# Patient Record
Sex: MALE | Race: WHITE
[De-identification: names, ages, dates, MRNs, and addresses within clinical notes are randomized per-mention and may not be internally consistent; named-entity substitution may affect disease eponyms.]

---

## 2018-01-03 ENCOUNTER — HOSPITAL ENCOUNTER (EMERGENCY)
Dept: HOSPITAL 17 - NEPE | Age: 68
Discharge: HOME | End: 2018-01-03
Payer: MEDICARE

## 2018-01-03 VITALS
DIASTOLIC BLOOD PRESSURE: 101 MMHG | HEART RATE: 58 BPM | SYSTOLIC BLOOD PRESSURE: 230 MMHG | OXYGEN SATURATION: 97 % | RESPIRATION RATE: 16 BRPM

## 2018-01-03 VITALS — BODY MASS INDEX: 25.77 KG/M2 | WEIGHT: 207.23 LBS | HEIGHT: 75 IN

## 2018-01-03 VITALS — DIASTOLIC BLOOD PRESSURE: 90 MMHG | SYSTOLIC BLOOD PRESSURE: 230 MMHG

## 2018-01-03 VITALS
TEMPERATURE: 99.2 F | HEART RATE: 75 BPM | OXYGEN SATURATION: 97 % | SYSTOLIC BLOOD PRESSURE: 239 MMHG | RESPIRATION RATE: 18 BRPM | DIASTOLIC BLOOD PRESSURE: 107 MMHG

## 2018-01-03 DIAGNOSIS — N40.0: ICD-10-CM

## 2018-01-03 DIAGNOSIS — N39.0: ICD-10-CM

## 2018-01-03 DIAGNOSIS — E87.6: ICD-10-CM

## 2018-01-03 DIAGNOSIS — I10: ICD-10-CM

## 2018-01-03 DIAGNOSIS — N28.9: ICD-10-CM

## 2018-01-03 DIAGNOSIS — M19.90: ICD-10-CM

## 2018-01-03 DIAGNOSIS — N41.0: Primary | ICD-10-CM

## 2018-01-03 LAB
BACTERIA #/AREA URNS HPF: (no result) /HPF
BASOPHILS # BLD AUTO: 0 TH/MM3 (ref 0–0.2)
BASOPHILS NFR BLD: 0.3 % (ref 0–2)
BUN SERPL-MCNC: 14 MG/DL (ref 7–18)
CALCIUM SERPL-MCNC: 8.8 MG/DL (ref 8.5–10.1)
CHLORIDE SERPL-SCNC: 105 MEQ/L (ref 98–107)
COLOR UR: (no result)
CREAT SERPL-MCNC: 1.45 MG/DL (ref 0.6–1.3)
EOSINOPHIL # BLD: 0 TH/MM3 (ref 0–0.4)
EOSINOPHIL NFR BLD: 0.2 % (ref 0–4)
ERYTHROCYTE [DISTWIDTH] IN BLOOD BY AUTOMATED COUNT: 13.3 % (ref 11.6–17.2)
GFR SERPLBLD BASED ON 1.73 SQ M-ARVRAT: 49 ML/MIN (ref 89–?)
GLUCOSE SERPL-MCNC: 100 MG/DL (ref 74–106)
GLUCOSE UR STRIP-MCNC: (no result) MG/DL
HCO3 BLD-SCNC: 32.7 MEQ/L (ref 21–32)
HCT VFR BLD CALC: 39.5 % (ref 39–51)
HGB BLD-MCNC: 13.1 GM/DL (ref 13–17)
HGB UR QL STRIP: (no result)
KETONES UR STRIP-MCNC: (no result) MG/DL
LYMPHOCYTES # BLD AUTO: 1.3 TH/MM3 (ref 1–4.8)
LYMPHOCYTES NFR BLD AUTO: 10.6 % (ref 9–44)
MCH RBC QN AUTO: 29.4 PG (ref 27–34)
MCHC RBC AUTO-ENTMCNC: 33.3 % (ref 32–36)
MCV RBC AUTO: 88.2 FL (ref 80–100)
MONOCYTE #: 0.7 TH/MM3 (ref 0–0.9)
MONOCYTES NFR BLD: 6.2 % (ref 0–8)
NEUTROPHILS # BLD AUTO: 9.9 TH/MM3 (ref 1.8–7.7)
NEUTROPHILS NFR BLD AUTO: 82.7 % (ref 16–70)
NITRITE UR QL STRIP: (no result)
PLATELET # BLD: 165 TH/MM3 (ref 150–450)
PMV BLD AUTO: 8.7 FL (ref 7–11)
RBC # BLD AUTO: 4.47 MIL/MM3 (ref 4.5–5.9)
SODIUM SERPL-SCNC: 142 MEQ/L (ref 136–145)
SP GR UR STRIP: 1.01 (ref 1–1.03)
SQUAMOUS #/AREA URNS HPF: <1 /HPF (ref 0–5)
URINE LEUKOCYTE ESTERASE: (no result)
WBC # BLD AUTO: 12 TH/MM3 (ref 4–11)

## 2018-01-03 PROCEDURE — 99284 EMERGENCY DEPT VISIT MOD MDM: CPT

## 2018-01-03 PROCEDURE — 85025 COMPLETE CBC W/AUTO DIFF WBC: CPT

## 2018-01-03 PROCEDURE — 96374 THER/PROPH/DIAG INJ IV PUSH: CPT

## 2018-01-03 PROCEDURE — 81001 URINALYSIS AUTO W/SCOPE: CPT

## 2018-01-03 PROCEDURE — 80048 BASIC METABOLIC PNL TOTAL CA: CPT

## 2018-01-03 PROCEDURE — 87086 URINE CULTURE/COLONY COUNT: CPT

## 2018-01-03 NOTE — PD
HPI


Chief Complaint:   Complaint


Time Seen by Provider:  09:22


Travel History


International Travel<30 days:  No


Contact w/Intl Traveler<30days:  No


Traveled to known affect area:  No





History of Present Illness


HPI


This is a 67-year-old male who presents to the emergency department with 4 days 

of intermittent blood in the urine, difficulty urinating, dysuria and 

discomfort in his lower abdomen, constant, moderate severity associated with 

some subjective fevers and chills.  He has a history of an enlarged prostate.  

He hasn't seen a primary care doctor in several years.  He also reports that he'

s been having a flare of his arthritis mostly in his lower extremities that's 

been going on over the past month and his blood pressure has been running high.

  He is not on any medicines for his blood pressure.





PFSH


Past Medical History


Arthritis:  Yes


Heart Rhythm Problems:  Yes (MURMUR)


Genitourinary:  Yes (PROSTATE)


Medical other:  Yes (RITTERS SYNDROME)


Tetanus Vaccination:  Unknown


Influenza Vaccination:  No





Past Surgical History


Other Surgery:  Yes (CUT ARTERY IN RIGHT FOREARM)





Social History


Alcohol Use:  No


Tobacco Use:  No


Substance Use:  No





Allergies-Medications


(Allergen,Severity, Reaction):  


Coded Allergies:  


     Egg Derived (Verified  Allergy, Severe, asthma attack, 1/3/18)


     ragweed pollen (Verified  Allergy, Severe, asthma attack, 1/3/18)


     strawberry (Verified  Allergy, Severe, asthma , 1/3/18)


Uncoded Allergies:  


     bird dander (Allergy, Severe, asthma attack, 1/3/18)


     jose (Allergy, Severe, asthma attack, 1/3/18)


Reported Meds & Prescriptions





Reported Meds & Active Scripts


Active


Tramadol (Tramadol HCl) 50 Mg Tab 50 Mg PO Q6H PRN


Bactrim DS (Sulfamethoxazole-Trimethoprim) 800-160 Mg Tab 1 Tab PO BID 42 Days


Amlodipine (Amlodipine Besylate) 5 Mg Tab 5 Mg PO DAILY


Flomax (Tamsulosin HCl) 0.4 Mg Cap 0.4 Mg PO HS








Review of Systems


Except as stated in HPI:  all other systems reviewed are Neg





Physical Exam


Narrative


GENERAL:Well appearing, no acute distress


SKIN: Focused skin assessment warm and dry.


HEAD: Atraumatic. Normocephalic. 


EYES: Pupils equal and round.  No injection or drainage. 


ENT:  Moist mucous membranes


NECK: Trachea midline. 


CARDIOVASCULAR: Regular rate and rhythm.  No murmur appreciated.


RESPIRATORY: Clear to auscultation. Breath sounds equal bilaterally. 


GASTROINTESTINAL: Abdomen soft, tender to palpation in the lower abdomen with 

no rebound or guarding.Grossly enlarged prostate, tender to palpation


MUSCULOSKELETAL: No obvious deformities. 


NEUROLOGICAL: Awake and alert. No obvious cranial nerve deficits.  Moving all 

extremities.


PSYCHIATRIC: Appropriate mood and affect; insight and judgment normal.





Data


Data


Last Documented VS





Vital Signs








  Date Time  Temp Pulse Resp B/P (MAP) Pulse Ox O2 Delivery O2 Flow Rate FiO2


 


1/3/18 09:30  58 16 230/101 (144) 97 Room Air  


 


1/3/18 08:36 99.2       








Orders





 Orders


Urinalysis - C+S If Indicated (1/3/18 08:54)


Complete Blood Count With Diff (1/3/18 09:07)


Basic Metabolic Panel (Bmp) (1/3/18 09:07)


^ Insert Iv (1/3/18 09:07)


Urine Culture (1/3/18 08:40)


Ketorolac Inj (Toradol Inj) (1/3/18 10:30)


Ciprofloxacin 400 Mg Premix (Cipro 400 M (1/3/18 10:30)


Amlodipine (Norvasc) (1/3/18 10:30)


Ed Discharge Order (1/3/18 11:41)





Labs





Laboratory Tests








Test


  1/3/18


08:40 1/3/18


10:35


 


Urine Color LIGHT-YELLOW  


 


Urine Turbidity HAZY  


 


Urine pH 7.5  


 


Urine Specific Gravity 1.009  


 


Urine Protein NEG mg/dL  


 


Urine Glucose (UA) NEG mg/dL  


 


Urine Ketones NEG mg/dL  


 


Urine Occult Blood SMALL  


 


Urine Nitrite NEG  


 


Urine Bilirubin NEG  


 


Urine Urobilinogen


  LESS THAN 2.0


MG/DL 


 


 


Urine Leukocyte Esterase LARGE  


 


Urine RBC 27 /hpf  


 


Urine WBC  /hpf  


 


Urine Squamous Epithelial


Cells <1 /hpf 


  


 


 


Urine Bacteria RARE /hpf  


 


Microscopic Urinalysis Comment


  CULTURE


INDICATED 


 


 


White Blood Count  12.0 TH/MM3 


 


Red Blood Count  4.47 MIL/MM3 


 


Hemoglobin  13.1 GM/DL 


 


Hematocrit  39.5 % 


 


Mean Corpuscular Volume  88.2 FL 


 


Mean Corpuscular Hemoglobin  29.4 PG 


 


Mean Corpuscular Hemoglobin


Concent 


  33.3 % 


 


 


Red Cell Distribution Width  13.3 % 


 


Platelet Count  165 TH/MM3 


 


Mean Platelet Volume  8.7 FL 


 


Neutrophils (%) (Auto)  82.7 % 


 


Lymphocytes (%) (Auto)  10.6 % 


 


Monocytes (%) (Auto)  6.2 % 


 


Eosinophils (%) (Auto)  0.2 % 


 


Basophils (%) (Auto)  0.3 % 


 


Neutrophils # (Auto)  9.9 TH/MM3 


 


Lymphocytes # (Auto)  1.3 TH/MM3 


 


Monocytes # (Auto)  0.7 TH/MM3 


 


Eosinophils # (Auto)  0.0 TH/MM3 


 


Basophils # (Auto)  0.0 TH/MM3 


 


CBC Comment  DIFF FINAL 


 


Differential Comment   


 


Blood Urea Nitrogen  14 MG/DL 


 


Creatinine  1.45 MG/DL 


 


Random Glucose  100 MG/DL 


 


Calcium Level  8.8 MG/DL 


 


Sodium Level  142 MEQ/L 


 


Potassium Level  3.2 MEQ/L 


 


Chloride Level  105 MEQ/L 


 


Carbon Dioxide Level  32.7 MEQ/L 


 


Anion Gap  4 MEQ/L 


 


Estimat Glomerular Filtration


Rate 


  49 ML/MIN 


 











MDM


Medical Decision Making


Medical Screen Exam Complete:  Yes


Emergency Medical Condition:  Yes


Interpretation(s)


temperature is 99.2, hypertensive


leukocytosis


mild hypokalemia


renal insufficiency


urinalysis: infection


Differential Diagnosis


Prostatitis, urinary tract infection, pyelonephritis, sepsis


Narrative Course


This is a 67-year-old male who presents to the emergency department with dysuria

, pressure in his lower abdomen and some rectal pain associated with some blood 

in his urine.  I suspect he has prostatitis.  Labs were obtained which 

demonstrate a mild leukocytosis.  Electrolytes are reassuring.  Urinalysis 

demonstrates urinary tract infection.  Patient is quite hypertensive which I 

think is chronic.  He is nontoxic appearing.  He will be initiated on 

amlodipine and he'll be discharged with Bactrim for 6 weeks.  I urged him to 

follow-up with a primary care physician.





Diagnosis





 Primary Impression:  


 Prostatitis


 Qualified Codes:  N41.0 - Acute prostatitis


Patient Instructions:  General Instructions





***Additional Instructions:  


If you develop fever, persistent vomiting, back pain, or inability to eat 

return to the emergency department.


Complete your antibiotics as prescribed.


Stay well hydrated with Gatorade or water.


Followup with your primary care physician in 2-3 days if your symptoms have not 

resolved.


***Med/Other Pt SpecificInfo:  Prescription(s) given


Scripts


Tramadol (Tramadol) 50 Mg Tab


50 MG PO Q6H Y for PAIN, #15 TAB 0 Refills


   Prov: Lynne Doll MD         1/3/18 


Sulfamethoxazole-Trimethoprim (Bactrim DS) 800-160 Mg Tab


1 TAB PO BID for Infection for 42 Days, #84 TAB 0 Refills


   Prov: Lynne Doll MD         1/3/18 


Amlodipine (Amlodipine) 5 Mg Tab


5 MG PO DAILY for Blood Pressure Management, #30 TAB 0 Refills


   Prov: Lynne Doll MD         1/3/18 


Tamsulosin (Flomax) 0.4 Mg Cap


0.4 MG PO HS for Manage Prostate Problems, #30 CAP 0 Refills


   Prov: Lynne Doll MD         1/3/18


Disposition:  01 DISCHARGE HOME


Condition:  Stable











Lynne Doll MD Cesario 3, 2018 11:41

## 2018-01-21 ENCOUNTER — HOSPITAL ENCOUNTER (INPATIENT)
Dept: HOSPITAL 17 - NEPE | Age: 68
LOS: 2 days | Discharge: HOME | DRG: 682 | End: 2018-01-23
Attending: HOSPITALIST | Admitting: HOSPITALIST
Payer: MEDICARE

## 2018-01-21 DIAGNOSIS — M19.90: ICD-10-CM

## 2018-01-21 DIAGNOSIS — F41.9: ICD-10-CM

## 2018-01-21 DIAGNOSIS — N40.1: ICD-10-CM

## 2018-01-21 DIAGNOSIS — N39.0: ICD-10-CM

## 2018-01-21 DIAGNOSIS — N13.1: ICD-10-CM

## 2018-01-21 DIAGNOSIS — E87.5: ICD-10-CM

## 2018-01-21 DIAGNOSIS — Z91.018: ICD-10-CM

## 2018-01-21 DIAGNOSIS — N13.8: ICD-10-CM

## 2018-01-21 DIAGNOSIS — M02.39: ICD-10-CM

## 2018-01-21 DIAGNOSIS — I10: ICD-10-CM

## 2018-01-21 DIAGNOSIS — F12.90: ICD-10-CM

## 2018-01-21 DIAGNOSIS — N17.9: Primary | ICD-10-CM

## 2018-01-21 DIAGNOSIS — K85.90: ICD-10-CM

## 2018-01-21 DIAGNOSIS — I16.0: ICD-10-CM

## 2018-01-21 DIAGNOSIS — Z91.012: ICD-10-CM

## 2018-01-21 DIAGNOSIS — Z87.891: ICD-10-CM

## 2018-01-21 LAB
ALBUMIN: 3.9 GM/DL (ref 3.4–5)
ALKALINE PHOSPHATASE: 80 U/L (ref 45–117)
ALT (GPT): 33 U/L (ref 12–78)
ANION GAP: 10 MEQ/L (ref 5–15)
AST (GOT): 21 U/L (ref 15–37)
AUTOMATED NEUTROPHIL #: 9.5 TH/MM3 (ref 1.8–7.7)
BACTERIA, URINE: (no result) /HPF
BASOPHIL #: 0 TH/MM3 (ref 0–0.2)
BASOPHIL %: 0.4 % (ref 0–2)
BICARBONATE: 23 MEQ/L (ref 21–32)
BILIRUB SERPL-MCNC: 0.5 MG/DL (ref 0.2–1)
BLOOD UREA NITROGEN: 40 MG/DL (ref 7–18)
BLOOD, URINE: (no result)
CALCIUM: 9.3 MG/DL (ref 8.5–10.1)
CHLORIDE: 102 MEQ/L (ref 98–107)
COMMENT (UR): (no result)
CREATININE: 5.89 MG/DL (ref 0.6–1.3)
CULTURE IF INDICATED: (no result)
EOSINOPHIL #: 0.1 TH/MM3 (ref 0–0.4)
EOSINOPHIL %: 0.8 % (ref 0–4)
GLOMERULAR FILTRATION RATE: 10 ML/MIN (ref 89–?)
GLUCOSE,RANDOM: 91 MG/DL (ref 74–106)
GLUCOSE,URINE: (no result) MG/DL
HEMATOCRIT: 39.6 % (ref 39–51)
HEMO FLAGS: (no result)
HEMOGLOBIN: 13.2 GM/DL (ref 13–17)
KETONE, URINE: (no result) MG/DL
LACTIC ACID: 0.8 MMOL/L (ref 0.4–2)
LIPASE: 438 U/L (ref 73–393)
LYMPH %: 10.1 % (ref 9–44)
LYMPHOCYTE #: 1.2 TH/MM3 (ref 1–4.8)
MEAN CELL VOLUME: 87.2 FL (ref 80–100)
MEAN CORPUSCULAR HEMOGLOBIN: 29.2 PG (ref 27–34)
MEAN CORPUSCULAR HGB CONC: 33.4 % (ref 32–36)
MEAN PLATELET VOLUME: 7 FL (ref 7–11)
MONO %: 5.9 % (ref 0–8)
MONOCYTE #: 0.7 TH/MM3 (ref 0–0.9)
NEUT %: 82.8 % (ref 16–70)
NITRITE,URINE: (no result)
PLATELET COUNT: 251 TH/MM3 (ref 150–450)
POTASSIUM: 5.5 MEQ/L (ref 3.5–5.1)
RED BLOOD COUNT: 4.53 MIL/MM3 (ref 4.5–5.9)
RED CELL DISTRIBUTION WIDTH: 13.6 % (ref 11.6–17.2)
SODIUM (NA): 135 MEQ/L (ref 136–145)
SPECIFIC GRAVITY,URINE: 1.01 (ref 1–1.03)
TOTAL PROTEIN: 8.5 GM/DL (ref 6.4–8.2)
URINE COLOR: (no result)
URINE LEUKOCYTE ESTERASE: (no result)
WHITE BLOOD CELL CLUMPS: (no result)
WHITE BLOOD COUNT: 11.5 TH/MM3 (ref 4–11)

## 2018-01-21 PROCEDURE — 81001 URINALYSIS AUTO W/SCOPE: CPT

## 2018-01-21 PROCEDURE — 83690 ASSAY OF LIPASE: CPT

## 2018-01-21 PROCEDURE — 85025 COMPLETE CBC W/AUTO DIFF WBC: CPT

## 2018-01-21 PROCEDURE — 87040 BLOOD CULTURE FOR BACTERIA: CPT

## 2018-01-21 PROCEDURE — 74176 CT ABD & PELVIS W/O CONTRAST: CPT

## 2018-01-21 PROCEDURE — 83605 ASSAY OF LACTIC ACID: CPT

## 2018-01-21 PROCEDURE — 87086 URINE CULTURE/COLONY COUNT: CPT

## 2018-01-21 PROCEDURE — 96375 TX/PRO/DX INJ NEW DRUG ADDON: CPT

## 2018-01-21 PROCEDURE — 99285 EMERGENCY DEPT VISIT HI MDM: CPT

## 2018-01-21 PROCEDURE — 96365 THER/PROPH/DIAG IV INF INIT: CPT

## 2018-01-21 PROCEDURE — 80053 COMPREHEN METABOLIC PANEL: CPT

## 2018-01-21 RX ADMIN — TAMSULOSIN HYDROCHLORIDE 1 MG: 0.4 CAPSULE ORAL at 20:07

## 2018-01-21 RX ADMIN — Medication 1 ML: at 20:08

## 2018-01-21 RX ADMIN — STANDARDIZED SENNA CONCENTRATE AND DOCUSATE SODIUM 1 TAB: 8.6; 5 TABLET, FILM COATED ORAL at 20:08

## 2018-01-21 RX ADMIN — PHENYTOIN SODIUM 1 MLS/HR: 50 INJECTION INTRAMUSCULAR; INTRAVENOUS at 17:01

## 2018-01-21 RX ADMIN — SODIUM CHLORIDE 1 MLS/HR: 900 INJECTION INTRAVENOUS at 17:01

## 2018-01-21 RX ADMIN — MORPHINE SULFATE 1 MG: 2 INJECTION, SOLUTION INTRAMUSCULAR; INTRAVENOUS at 17:01

## 2018-01-21 RX ADMIN — ONDANSETRON 1 MG: 2 INJECTION, SOLUTION INTRAMUSCULAR; INTRAVENOUS at 17:02

## 2018-01-21 RX ADMIN — SODIUM BICARBONATE 1 MLS/HR: 84 INJECTION, SOLUTION INTRAVENOUS at 19:23

## 2018-01-22 LAB
ALBUMIN: 3.4 GM/DL (ref 3.4–5)
ALKALINE PHOSPHATASE: 68 U/L (ref 45–117)
ALT (GPT): 29 U/L (ref 12–78)
ANION GAP: 8 MEQ/L (ref 5–15)
AST (GOT): 22 U/L (ref 15–37)
AUTOMATED NEUTROPHIL #: 8 TH/MM3 (ref 1.8–7.7)
BASOPHIL #: 0.1 TH/MM3 (ref 0–0.2)
BASOPHIL %: 0.6 % (ref 0–2)
BICARBONATE: 22.1 MEQ/L (ref 21–32)
BILIRUB SERPL-MCNC: 0.4 MG/DL (ref 0.2–1)
BLOOD UREA NITROGEN: 27 MG/DL (ref 7–18)
CALCIUM: 9 MG/DL (ref 8.5–10.1)
CHLORIDE: 113 MEQ/L (ref 98–107)
CREATININE: 3.37 MG/DL (ref 0.6–1.3)
EOSINOPHIL #: 0.1 TH/MM3 (ref 0–0.4)
EOSINOPHIL %: 0.9 % (ref 0–4)
GLOMERULAR FILTRATION RATE: 18 ML/MIN (ref 89–?)
GLUCOSE,RANDOM: 102 MG/DL (ref 74–106)
HEMATOCRIT: 41.4 % (ref 39–51)
HEMO FLAGS: (no result)
HEMOGLOBIN: 14.3 GM/DL (ref 13–17)
LIPASE: 289 U/L (ref 73–393)
LYMPH %: 12.4 % (ref 9–44)
LYMPHOCYTE #: 1.3 TH/MM3 (ref 1–4.8)
MEAN CELL VOLUME: 87.2 FL (ref 80–100)
MEAN CORPUSCULAR HEMOGLOBIN: 30.2 PG (ref 27–34)
MEAN CORPUSCULAR HGB CONC: 34.6 % (ref 32–36)
MEAN PLATELET VOLUME: 7.6 FL (ref 7–11)
MONO %: 7.8 % (ref 0–8)
MONOCYTE #: 0.8 TH/MM3 (ref 0–0.9)
NEUT %: 78.3 % (ref 16–70)
PLATELET COUNT: 248 TH/MM3 (ref 150–450)
POTASSIUM: 5.5 MEQ/L (ref 3.5–5.1)
RED BLOOD COUNT: 4.75 MIL/MM3 (ref 4.5–5.9)
RED CELL DISTRIBUTION WIDTH: 13.8 % (ref 11.6–17.2)
SODIUM (NA): 143 MEQ/L (ref 136–145)
TOTAL PROTEIN: 7.9 GM/DL (ref 6.4–8.2)
WHITE BLOOD COUNT: 10.3 TH/MM3 (ref 4–11)

## 2018-01-22 RX ADMIN — Medication 1 ML: at 09:00

## 2018-01-22 RX ADMIN — TAMSULOSIN HYDROCHLORIDE 1 MG: 0.4 CAPSULE ORAL at 23:05

## 2018-01-22 RX ADMIN — Medication 1 ML: at 21:00

## 2018-01-22 RX ADMIN — SODIUM BICARBONATE 1 MLS/HR: 84 INJECTION, SOLUTION INTRAVENOUS at 18:23

## 2018-01-22 RX ADMIN — SODIUM CHLORIDE 1 MLS/HR: 900 INJECTION INTRAVENOUS at 17:38

## 2018-01-22 RX ADMIN — STANDARDIZED SENNA CONCENTRATE AND DOCUSATE SODIUM 1 TAB: 8.6; 5 TABLET, FILM COATED ORAL at 09:05

## 2018-01-22 RX ADMIN — STANDARDIZED SENNA CONCENTRATE AND DOCUSATE SODIUM 1 TAB: 8.6; 5 TABLET, FILM COATED ORAL at 09:00

## 2018-01-22 RX ADMIN — STANDARDIZED SENNA CONCENTRATE AND DOCUSATE SODIUM 1 TAB: 8.6; 5 TABLET, FILM COATED ORAL at 23:05

## 2018-01-23 LAB
ALBUMIN: 2.8 GM/DL (ref 3.4–5)
ALKALINE PHOSPHATASE: 58 U/L (ref 45–117)
ALT (GPT): 23 U/L (ref 12–78)
ANION GAP: 7 MEQ/L (ref 5–15)
AST (GOT): 12 U/L (ref 15–37)
AUTOMATED NEUTROPHIL #: 6.9 TH/MM3 (ref 1.8–7.7)
BASOPHIL #: 0.1 TH/MM3 (ref 0–0.2)
BASOPHIL %: 0.8 % (ref 0–2)
BICARBONATE: 25.7 MEQ/L (ref 21–32)
BILIRUB SERPL-MCNC: 0.4 MG/DL (ref 0.2–1)
BLOOD UREA NITROGEN: 17 MG/DL (ref 7–18)
CALCIUM: 8.9 MG/DL (ref 8.5–10.1)
CHLORIDE: 107 MEQ/L (ref 98–107)
CREATININE: 1.94 MG/DL (ref 0.6–1.3)
EOSINOPHIL #: 0.3 TH/MM3 (ref 0–0.4)
EOSINOPHIL %: 2.8 % (ref 0–4)
GLOMERULAR FILTRATION RATE: 35 ML/MIN (ref 89–?)
GLUCOSE,RANDOM: 93 MG/DL (ref 74–106)
HEMATOCRIT: 36.5 % (ref 39–51)
HEMO FLAGS: (no result)
HEMOGLOBIN: 12.5 GM/DL (ref 13–17)
LIPASE: 259 U/L (ref 73–393)
LYMPH %: 23.3 % (ref 9–44)
LYMPHOCYTE #: 2.4 TH/MM3 (ref 1–4.8)
MEAN CELL VOLUME: 88 FL (ref 80–100)
MEAN CORPUSCULAR HEMOGLOBIN: 30.2 PG (ref 27–34)
MEAN CORPUSCULAR HGB CONC: 34.4 % (ref 32–36)
MEAN PLATELET VOLUME: 7.3 FL (ref 7–11)
MONO %: 7.3 % (ref 0–8)
MONOCYTE #: 0.8 TH/MM3 (ref 0–0.9)
NEUT %: 65.8 % (ref 16–70)
PLATELET COUNT: 251 TH/MM3 (ref 150–450)
POTASSIUM: 4.7 MEQ/L (ref 3.5–5.1)
RED BLOOD COUNT: 4.15 MIL/MM3 (ref 4.5–5.9)
RED CELL DISTRIBUTION WIDTH: 13.6 % (ref 11.6–17.2)
SODIUM (NA): 140 MEQ/L (ref 136–145)
TOTAL PROTEIN: 6.9 GM/DL (ref 6.4–8.2)
WHITE BLOOD COUNT: 10.4 TH/MM3 (ref 4–11)

## 2018-01-23 RX ADMIN — STANDARDIZED SENNA CONCENTRATE AND DOCUSATE SODIUM 1 TAB: 8.6; 5 TABLET, FILM COATED ORAL at 08:35

## 2018-01-23 RX ADMIN — Medication 1 ML: at 08:36
